# Patient Record
Sex: MALE | Race: WHITE | NOT HISPANIC OR LATINO
[De-identification: names, ages, dates, MRNs, and addresses within clinical notes are randomized per-mention and may not be internally consistent; named-entity substitution may affect disease eponyms.]

---

## 2023-03-07 PROBLEM — Z00.00 ENCOUNTER FOR PREVENTIVE HEALTH EXAMINATION: Status: ACTIVE | Noted: 2023-03-07

## 2023-03-14 ENCOUNTER — APPOINTMENT (OUTPATIENT)
Dept: PULMONOLOGY | Facility: CLINIC | Age: 88
End: 2023-03-14
Payer: MEDICARE

## 2023-03-14 ENCOUNTER — NON-APPOINTMENT (OUTPATIENT)
Age: 88
End: 2023-03-14

## 2023-03-14 VITALS
OXYGEN SATURATION: 97 % | HEIGHT: 68 IN | TEMPERATURE: 97 F | BODY MASS INDEX: 29.1 KG/M2 | SYSTOLIC BLOOD PRESSURE: 132 MMHG | DIASTOLIC BLOOD PRESSURE: 58 MMHG | HEART RATE: 69 BPM | WEIGHT: 192 LBS

## 2023-03-14 DIAGNOSIS — J90 PLEURAL EFFUSION, NOT ELSEWHERE CLASSIFIED: ICD-10-CM

## 2023-03-14 PROCEDURE — 99205 OFFICE O/P NEW HI 60 MIN: CPT

## 2023-03-14 RX ORDER — FINASTERIDE 5 MG/1
5 TABLET, FILM COATED ORAL
Refills: 0 | Status: ACTIVE | COMMUNITY

## 2023-03-14 RX ORDER — FLUOXETINE HCL 10 MG
TABLET ORAL
Refills: 0 | Status: ACTIVE | COMMUNITY

## 2023-03-14 RX ORDER — TAMSULOSIN HCL 0.4 MG
0.4 CAPSULE ORAL
Refills: 0 | Status: ACTIVE | COMMUNITY

## 2023-03-14 RX ORDER — IPRATROPIUM BROMIDE 42 UG/1
0.06 SPRAY, METERED NASAL
Refills: 0 | Status: ACTIVE | COMMUNITY

## 2023-03-14 RX ORDER — METOPROLOL TARTRATE 25 MG/1
25 TABLET, FILM COATED ORAL
Refills: 0 | Status: ACTIVE | COMMUNITY

## 2023-03-14 RX ORDER — TORSEMIDE 20 MG/1
20 TABLET ORAL
Refills: 0 | Status: ACTIVE | COMMUNITY

## 2023-03-14 RX ORDER — SPIRONOLACTONE 25 MG/1
25 TABLET ORAL
Refills: 0 | Status: ACTIVE | COMMUNITY

## 2023-03-14 RX ORDER — CARBIDOPA AND LEVODOPA 25; 100 MG/1; MG/1
25-100 TABLET ORAL
Refills: 0 | Status: ACTIVE | COMMUNITY

## 2023-03-14 RX ORDER — ASPIRIN 81 MG
81 TABLET, DELAYED RELEASE (ENTERIC COATED) ORAL
Refills: 0 | Status: ACTIVE | COMMUNITY

## 2023-03-14 NOTE — HISTORY OF PRESENT ILLNESS
[Former] : former [Never] : never [TextBox_4] : 3/14/2023 Chidi Laureano is a 87-year-old man with a history of Parkinson's disease and multiple falls.  The patient was most recently in the hospital in late February with a fall that was evaluated in the emergency room and he was sent home.  The patient had a incidental finding of a pleural effusion and was told to have that followed up in this office.  Patient denies any history of lung disease and was a never smoker.  Patient did have a history of Aortic valve replacement in 2007 but has no complications nor does he have CHF  He has A fib and is on ASA.   He has had many falls and is not a candidate for full A/c. The Ct scans were reviewed and there is triangular density along the minor fissure and the thickest part of the triangle is laterally as opposed to an infiltrate or atelectasis that would better fit that description   It will be followed with a repeat Ct scan in [place]   I have explained this to the patient and his wife Rocío laureano,  I answered all  of her questions time spent 60 minutes  counseling, education, documentation, imaging reviewed, medication reviewed, inhaler demonstrated, old records reviewed, HX and PE   [TextBox_11] : 1 [TextBox_13] : 4 [YearQuit] : 1962

## 2023-03-14 NOTE — REASON FOR VISIT
[Initial] : an initial visit [Spouse] : spouse [TextBox_44] : .  Patient is here for an initial visit.  He recently was hospitalized at [facility], during his stay he had a CT scan and was advised to see a Pulmonologist.

## 2023-06-06 ENCOUNTER — APPOINTMENT (OUTPATIENT)
Dept: PULMONOLOGY | Facility: CLINIC | Age: 88
End: 2023-06-06
Payer: MEDICARE

## 2023-06-06 VITALS
OXYGEN SATURATION: 96 % | TEMPERATURE: 97.6 F | BODY MASS INDEX: 29.1 KG/M2 | HEART RATE: 65 BPM | HEIGHT: 68 IN | SYSTOLIC BLOOD PRESSURE: 110 MMHG | WEIGHT: 192 LBS | DIASTOLIC BLOOD PRESSURE: 60 MMHG

## 2023-06-06 DIAGNOSIS — R93.89 ABNORMAL FINDINGS ON DIAGNOSTIC IMAGING OF OTHER SPECIFIED BODY STRUCTURES: ICD-10-CM

## 2023-06-06 PROCEDURE — 99214 OFFICE O/P EST MOD 30 MIN: CPT

## 2023-06-06 RX ORDER — OXYBUTYNIN CHLORIDE 10 MG/1
10 TABLET, EXTENDED RELEASE ORAL
Refills: 0 | Status: DISCONTINUED | COMMUNITY
End: 2023-06-06

## 2023-06-12 ENCOUNTER — NON-APPOINTMENT (OUTPATIENT)
Age: 88
End: 2023-06-12

## 2023-08-03 ENCOUNTER — NON-APPOINTMENT (OUTPATIENT)
Age: 88
End: 2023-08-03

## 2023-08-03 PROBLEM — R93.89 ABNORMAL CHEST CT: Status: ACTIVE | Noted: 2023-08-03

## 2023-08-03 NOTE — REASON FOR VISIT
[Follow-Up] : a follow-up visit [Spouse] : spouse [TextBox_44] : pleural effusion, 2 month.  Patient has no Pulmonary complaints.  He had a CT scan 5/30/23 at [facility].

## 2023-08-03 NOTE — PHYSICAL EXAM
[TextBox_2] : bmi 29  [TextBox_54] : afib  (+) syst  murmur  [TextBox_80] : clear [TextBox_99] : Parkinson's dis  with limited mobility

## 2023-08-03 NOTE — ASSESSMENT
[FreeTextEntry1] :      3/14/2023 Chidi Pa is a 87-year-old man with a history of Parkinson's disease and multiple falls. The patient was most recently in the hospital in late February with a fall that was evaluated in the emergency room and he was sent home. The patient had a incidental finding of a pleural effusion and was told to have that followed up in this office. Patient denies any history of lung disease and was a never smoker. Patient did have a history of Aortic valve replacement in 2007 but has no complications nor does he have CHF He has A fib and is on ASA. He has had many falls and is not a candidate for full A/c. The Ct scans were reviewed and there is triangular density along the minor fissure and the thickest part of the triangle is laterally as opposed to an infiltrate or atelectasis that would better fit that description It will be followed with a repeat Ct scan in [place]  6/6/23 1) The ct scan from 5/30 report was reviewed and there was no residual pleural effusion and no acute lung process There are hypoattenuating lesions in the liver and compared to CT of 1/2023  they are unchanged    He has no acute pulmo nary sx   I have discussed the findings with him and his wife 2) spirometry in march was normal  He is on inhaled nebulized atrovent   3)  f/u in 4 months and decide when he should have a f/u ct  most likely  3/2024  time spent 30 mins  counselling, documentation, med reconciliation,  old records reviewed  HX and PE

## 2023-08-03 NOTE — REVIEW OF SYSTEMS
[Sputum] : no sputum [Wheezing] : no wheezing [Chest Discomfort] : no chest discomfort [Orthopnea] : no orthopnea [Nocturia] : nocturia [Anemia] : no anemia [Negative] : Psychiatric [TextBox_3] : bmi 29 [TextBox_44] :  a fib [TextBox_119] : Parkinson's  Dis

## 2023-08-03 NOTE — HISTORY OF PRESENT ILLNESS
[Former] : former [Never] : never [TextBox_4] : 3/14/2023 Chidi Laureano is a 87-year-old man with a history of Parkinson's disease and multiple falls. The patient was most recently in the hospital in late February with a fall that was evaluated in the emergency room and he was sent home. The patient had a incidental finding of a pleural effusion and was told to have that followed up in this office. Patient denies any history of lung disease and was a never smoker. Patient did have a history of Aortic valve replacement in 2007 but has no complications nor does he have CHF He has A fib and is on ASA. He has had many falls and is not a candidate for full A/c. The Ct scans were reviewed and there is triangular density along the minor fissure and the thickest part of the triangle is laterally as opposed to an infiltrate or atelectasis that would better fit that description It will be followed with a repeat Ct scan in [place] I have explained this to the patient and his wife Rocío laureano,   6/6/23 Mr Laureano is 86 yo male with known Parkinson's disease  and  no interval pulmonary events .  His  activity is liited by the Parkinson's  disease.  He has  a complicated  cardiac hx and is to have a Watchman's procedure by Dr Patel     He had an abn  Ct of the chest and had a f/u in May    [TextBox_11] : 1 [TextBox_13] : 4 [YearQuit] : 1960

## 2023-09-18 ENCOUNTER — NON-APPOINTMENT (OUTPATIENT)
Age: 88
End: 2023-09-18

## 2023-09-28 ENCOUNTER — RESULT REVIEW (OUTPATIENT)
Age: 88
End: 2023-09-28

## 2023-10-18 ENCOUNTER — APPOINTMENT (OUTPATIENT)
Dept: PULMONOLOGY | Facility: CLINIC | Age: 88
End: 2023-10-18

## 2023-10-24 ENCOUNTER — NON-APPOINTMENT (OUTPATIENT)
Age: 88
End: 2023-10-24

## 2023-12-11 ENCOUNTER — NON-APPOINTMENT (OUTPATIENT)
Age: 88
End: 2023-12-11